# Patient Record
Sex: MALE | Race: WHITE
[De-identification: names, ages, dates, MRNs, and addresses within clinical notes are randomized per-mention and may not be internally consistent; named-entity substitution may affect disease eponyms.]

---

## 2017-06-20 ENCOUNTER — HOSPITAL ENCOUNTER (OUTPATIENT)
Dept: HOSPITAL 61 - KCIC | Age: 67
Discharge: HOME | End: 2017-06-20
Attending: FAMILY MEDICINE
Payer: MEDICARE

## 2017-06-20 DIAGNOSIS — M16.11: Primary | ICD-10-CM

## 2017-06-20 DIAGNOSIS — M17.12: ICD-10-CM

## 2017-06-20 PROCEDURE — 73560 X-RAY EXAM OF KNEE 1 OR 2: CPT

## 2017-06-20 PROCEDURE — 73502 X-RAY EXAM HIP UNI 2-3 VIEWS: CPT

## 2017-06-20 NOTE — [facility]
Two-view left knee

 

 

HISTORY: Pain. Injury in 1970. Increasing pain in the last weeks.

 

2 views standing left knee

Mild degenerative changes. Joint space loss at the medial compartment. 

Mild generalized osteophytosis. No acute fracture or dislocation. No 

significant soft tissue abnormality.

 

IMPRESSION: Primary osteoarthritis.

 

Two-view right hip

Poor visualization of the right hip due to overlying soft tissue. No 

obvious displaced fracture or dislocation.

 

IMPRESSION: No obvious displaced fracture or dislocation but visualization

of the hip is limited. Consider MR if further evaluation is warranted.

 

Electronically signed by: Isrrael Garcia MD (6/20/2017 1:07 PM)

## 2017-09-13 ENCOUNTER — HOSPITAL ENCOUNTER (OUTPATIENT)
Dept: HOSPITAL 61 - KCIC US | Age: 67
Discharge: HOME | End: 2017-09-13
Attending: FAMILY MEDICINE
Payer: MEDICARE

## 2017-09-13 DIAGNOSIS — N50.89: Primary | ICD-10-CM

## 2017-09-13 PROCEDURE — 76870 US EXAM SCROTUM: CPT

## 2017-09-13 NOTE — [facility]
Indication: Right testicular swelling.

 

The right testicle measures 2.6 x 1.9 x 2.2 cm and the left testicle 

measures 4.1 x 2.3 x 3.3 cm. Both testes demonstrate homogeneous 

echotexture. No discrete testicular mass is seen. There is blood flow to 

both testes. No significant hydrocele is identified. The right epididymis 

does appear to be larger than the left and show slightly more vascularity.

This could be owing to epididymitis. No other abnormality is seen.

 

IMPRESSION:

1. No evidence of testicular mass or vascular compromise.

2. Enlargement of the right epididymis and increased vascularity 

suggestive of epididymitis.

 

Electronically signed by: Olegario Manuel MD (9/13/2017 3:00 PM) [id number]

## 2020-08-07 ENCOUNTER — HOSPITAL ENCOUNTER (OUTPATIENT)
Dept: HOSPITAL 61 - US | Age: 70
End: 2020-08-07
Attending: FAMILY MEDICINE
Payer: MEDICARE

## 2020-08-07 DIAGNOSIS — K76.0: ICD-10-CM

## 2020-08-07 DIAGNOSIS — R16.1: Primary | ICD-10-CM

## 2020-08-07 PROCEDURE — 76700 US EXAM ABDOM COMPLETE: CPT

## 2020-08-07 NOTE — RAD
EXAM: ABDOMINAL ULTRASOUND.

 

HISTORY: Abdominal pain, upper abdomen.

 

COMPARISON: None.

 

FINDINGS: Sonographic evaluation of the abdomen was performed.

 

Liver is mildly enlarged, measuring 18.3 cm in length. Diffuse increased 

echogenicity compatible with fatty infiltration. There are no focal 

lesions. The spleen measures 13.4 cm.

 

The gallbladder is unremarkable without evidence of stones, wall 

thickening or pericholecystic fluid. There is no sonographic Mcfadden sign. 

The common duct measures 3 mm. The pancreas is obscured by bowel gas.

 

The right kidney measures 12 x 4.7 x 6.3 cm. Cortical thickness and 

echogenicity are preserved. Multiple cortical cysts are noted, measuring 

up to 3.6 cm in the midpole and 1.5 cm in the lateral lower pole. There is

no hydronephrosis. The left kidney measures 12.5 x 4.5 x 5.4 cm. Cortical 

thickness and echogenicity are preserved. There is no hydronephrosis. 

Inferior cysts measuring 1.6 cm laterally and 1.7 cm inferiorly.

 

The visualized portions of the abdominal aorta and inferior vena cava are 

grossly patent and normal in caliber.

 

IMPRESSION:

Mild splenomegaly and borderline hepatomegaly. Otherwise unremarkable 

abdominal ultrasound with poorly visualized pancreas due to bowel gas.

 

Electronically signed by: Yane Feliz MD (8/7/2020 10:04 AM) 

[id number]